# Patient Record
Sex: FEMALE | URBAN - METROPOLITAN AREA
[De-identification: names, ages, dates, MRNs, and addresses within clinical notes are randomized per-mention and may not be internally consistent; named-entity substitution may affect disease eponyms.]

---

## 2017-11-08 ENCOUNTER — APPOINTMENT (OUTPATIENT)
Age: 22
Setting detail: DERMATOLOGY
End: 2017-11-08

## 2017-11-08 DIAGNOSIS — L65.9 NONSCARRING HAIR LOSS, UNSPECIFIED: ICD-10-CM

## 2017-11-08 PROCEDURE — OTHER PRESCRIPTION MEDICATION MANAGEMENT: OTHER

## 2017-11-08 PROCEDURE — OTHER COUNSELING: OTHER

## 2017-11-08 PROCEDURE — OTHER EDUCATIONAL RESOURCES PROVIDED: OTHER

## 2017-11-08 PROCEDURE — 99201: CPT

## 2017-11-08 ASSESSMENT — LOCATION SIMPLE DESCRIPTION DERM
LOCATION SIMPLE: ANTERIOR SCALP
LOCATION SIMPLE: SCALP

## 2017-11-08 ASSESSMENT — LOCATION ZONE DERM: LOCATION ZONE: SCALP

## 2017-11-08 ASSESSMENT — LOCATION DETAILED DESCRIPTION DERM
LOCATION DETAILED: MID-FRONTAL SCALP
LOCATION DETAILED: RIGHT CENTRAL PARIETAL SCALP

## 2017-11-08 NOTE — HPI: HAIR LOSS
How Did The Hair Loss Occur?: gradual in onset
How Severe Is Your Hair Loss?: moderate
Additional History: Patient states that she cut all of her hair out and now that it is growing back she noticed that some areas are not growing or if she scratches the hair falls out.

## 2017-11-08 NOTE — PROCEDURE: COUNSELING
Detail Level: Zone
Patient Specific Counseling (Will Not Stick From Patient To Patient): Overall pattern is c/w resolving alopecia areata, but pt reports she never had baldness in the affected areas.  I can't be confident it is alopecia areata without a history of baldness at these sites.  The hair loss could be consistent with hair fragility/breakage, but it is unusual for that to be in such a distinct/localized pattern.  Finally, inflammatory alopecia is also a possiblity because pt reports some burning/itching in affected areas.  However, there is no visible inflammation now.\\nDiscussed possibly doing a biopsy to check for inflammation in the scalp, but since alopecia areata and inflammatory alopecia can be treated topically, will try topical tx first.  Pt is wearing hear naturally, so that would be help any hair damage/fragility to heal.\\nDr Heriberto prescribed clobetasol solution and I agree that is the best option for treatment at this time.  I instructed to use bid for 3 weeks on 1 week off.  If there is no improvement will consider biopsy at follow up.